# Patient Record
Sex: FEMALE | Race: WHITE
[De-identification: names, ages, dates, MRNs, and addresses within clinical notes are randomized per-mention and may not be internally consistent; named-entity substitution may affect disease eponyms.]

---

## 2018-05-29 ENCOUNTER — HOSPITAL ENCOUNTER (EMERGENCY)
Dept: HOSPITAL 72 - EMR | Age: 28
Discharge: HOME | End: 2018-05-29
Payer: COMMERCIAL

## 2018-05-29 VITALS — HEIGHT: 65 IN | BODY MASS INDEX: 22.66 KG/M2 | WEIGHT: 136 LBS

## 2018-05-29 VITALS — DIASTOLIC BLOOD PRESSURE: 77 MMHG | SYSTOLIC BLOOD PRESSURE: 118 MMHG

## 2018-05-29 DIAGNOSIS — L25.9: Primary | ICD-10-CM

## 2018-05-29 PROCEDURE — 99283 EMERGENCY DEPT VISIT LOW MDM: CPT

## 2018-05-29 NOTE — EMERGENCY ROOM REPORT
History of Present Illness


General


Chief Complaint:  Skin Rash/Abscess


Source:  Patient





Present Illness


HPI


Patient is a 28-year-old female who presented after increased rash to her lower 

extremities.  The patient reportedly  had been seen in urgent care.  She had 

been started on intramuscular steroids and as well as Bactrim.  The patient 

reports having recently gone hiking and had the development of lesions about 2 

weeks later.  The patient reports having the dog but does denies the dog having 

fleas.  She reported having some increased itching to other areas.


Allergies:  


Coded Allergies:  


     No Known Allergies (Unverified , 5/29/18)





Patient History


Past Medical History:  see triage record


Last Menstrual Period:  05/08/18


Reviewed Nursing Documentation:  PMH: Agreed; PSxH: Agreed





Review of Systems


All Other Systems:  negative except mentioned in HPI





Physical Exam





Vital Signs








  Date Time  Temp Pulse Resp B/P (MAP) Pulse Ox O2 Delivery O2 Flow Rate FiO2


 


5/29/18 17:19 98.2 84 19 119/77 94 Room Air  





 98.2       








General Appearance:  well appearing, no apparent distress, alert, GCS 15


Head:  normocephalic, atraumatic


ENT:  hearing grossly normal, normal voice


Neck:  full range of motion, supple


Respiratory:  no respiratory distress, speaking full sentences


Musculoskeletal:  no calf tenderness


Neurologic:  normal inspection, alert, oriented x3, responsive, normal gait


Psychiatric:  mood/affect normal


Skin:  other - bullous rash to bilateral lower extremities, right greater than 

left





Medical Decision Making


Diagnostic Impression:  


 Primary Impression:  


 Contact dermatitis


ER Course


The patient presented for skin rash.  The differential diagnosis included was 

not limited to contact dermatitis, allergic reaction to insect bite, a 

photosensitivity reaction, bullous impetigo, Antonio-Abilio syndrome among 

others. Patient has a benign exam and does not appear to require any further 

imaging or laboratory testing at this time.  The patient has what appears to be 

a allergic contact dermatitis.  This is likely due to the plant toxin during 

hiking.  The patient was advised wound care.  The patient is advised follow-up 

with dermatology for recheck in the next few days.  Patient is advised to 

return if any worsening condition or if any changes in status that are 

concerning.





This report is dictated with Dragon transcription software which may 

occasionally lead to discrepancies related to use of this software.





Last Vital Signs








  Date Time  Temp Pulse Resp B/P (MAP) Pulse Ox O2 Delivery O2 Flow Rate FiO2


 


5/29/18 17:29 98.2 74 19 118/77 99 Room Air  





 98.2       








Status:  improved


Disposition:  HOME, SELF-CARE


Condition:  Stable


Scripts


Triamcinolone Acet (Triamcinolone Acetonide) 15 Gm Cream..g.


15 GM APPLIC DAILY, #15 GM


   Prov: Boni Galloway MD         5/29/18


Referrals:  


NON PHYSICIAN (PCP)


Patient Instructions:  Contact Dermatitis











Boni Galloway MD May 29, 2018 18:13